# Patient Record
Sex: MALE | Race: BLACK OR AFRICAN AMERICAN | URBAN - METROPOLITAN AREA
[De-identification: names, ages, dates, MRNs, and addresses within clinical notes are randomized per-mention and may not be internally consistent; named-entity substitution may affect disease eponyms.]

---

## 2024-03-15 ENCOUNTER — TRANSFERRED RECORDS (OUTPATIENT)
Dept: HEALTH INFORMATION MANAGEMENT | Facility: CLINIC | Age: 29
End: 2024-03-15

## 2024-03-19 ENCOUNTER — TELEPHONE (OUTPATIENT)
Dept: BEHAVIORAL HEALTH | Facility: CLINIC | Age: 29
End: 2024-03-19

## 2024-03-19 NOTE — TELEPHONE ENCOUNTER
"Pt's benefits were verified. Writer called Sebastian at [facility] for scheduling. [facility] reported pt was transferred to a higher level of care due to \"neurological issues\". Writer conferred with  medical staff and decided that if pt returns to [facility] we can consider him but as of now pt will be taken off waitlist. Writer called Sebastian to inform him of this.  "

## 2024-03-19 NOTE — TELEPHONE ENCOUNTER
Writer received assessment from Sebastian @ [facility]. [phone]. Faxed to [facility] for scanning.     Pt is placed on the [facility] wait list pending insurance verification.